# Patient Record
Sex: FEMALE | NOT HISPANIC OR LATINO | Employment: FULL TIME | URBAN - METROPOLITAN AREA
[De-identification: names, ages, dates, MRNs, and addresses within clinical notes are randomized per-mention and may not be internally consistent; named-entity substitution may affect disease eponyms.]

---

## 2019-07-20 ENCOUNTER — HOSPITAL ENCOUNTER (EMERGENCY)
Facility: HOSPITAL | Age: 24
Discharge: HOME | End: 2019-07-20
Attending: EMERGENCY MEDICINE | Admitting: EMERGENCY MEDICINE

## 2019-07-20 VITALS
HEIGHT: 66 IN | RESPIRATION RATE: 18 BRPM | DIASTOLIC BLOOD PRESSURE: 61 MMHG | SYSTOLIC BLOOD PRESSURE: 107 MMHG | WEIGHT: 155 LBS | TEMPERATURE: 98.6 F | BODY MASS INDEX: 24.91 KG/M2 | HEART RATE: 62 BPM | OXYGEN SATURATION: 99 %

## 2019-07-20 DIAGNOSIS — E86.0 DEHYDRATION: Primary | ICD-10-CM

## 2019-07-20 LAB
ANION GAP SERPL CALCULATED.3IONS-SCNC: 11 MMOL/L (ref 4–13)
ANISOCYTOSIS BLD QL SMEAR: PRESENT
BUN SERPL-MCNC: 20 MG/DL (ref 7–25)
CALCIUM SERPL-MCNC: 9.9 MG/DL (ref 8.6–10.5)
CHLORIDE SERPL-SCNC: 104 MMOL/L (ref 98–107)
CO2 SERPL-SCNC: 26 MMOL/L (ref 21–31)
CREAT SERPL-MCNC: 0.9 MG/DL (ref 0.6–1.2)
ERYTHROCYTE [DISTWIDTH] IN BLOOD BY AUTOMATED COUNT: 15.4 % (ref 11.5–14.5)
GFR SERPL CREATININE-BSD FRML MDRD: 91 ML/MIN/1.73SQ M
GLUCOSE SERPL-MCNC: 103 MG/DL (ref 65–99)
HCT VFR BLD AUTO: 42.3 % (ref 42–47)
HGB BLD-MCNC: 13.5 G/DL (ref 12–16)
LYMPHOCYTES # BLD AUTO: 0.83 THOUSAND/UL (ref 0.6–4.47)
LYMPHOCYTES # BLD AUTO: 4 % (ref 20–51)
MCH RBC QN AUTO: 20.8 PG (ref 26–34)
MCHC RBC AUTO-ENTMCNC: 31.9 G/DL (ref 31–37)
MCV RBC AUTO: 65 FL (ref 81–99)
MICROCYTES BLD QL AUTO: PRESENT
MONOCYTES # BLD AUTO: 0.21 THOUSAND/UL (ref 0–1.22)
MONOCYTES NFR BLD AUTO: 1 % (ref 4–12)
NEUTS BAND NFR BLD MANUAL: 17 % (ref 0–8)
NEUTS SEG # BLD: 19.76 THOUSAND/UL (ref 1.81–6.82)
NEUTS SEG NFR BLD AUTO: 78 % (ref 42–75)
OVALOCYTES BLD QL SMEAR: PRESENT
PLATELET # BLD AUTO: 311 THOUSANDS/UL (ref 149–390)
PLATELET BLD QL SMEAR: ADEQUATE
PMV BLD AUTO: 8.7 FL (ref 8.6–11.7)
POTASSIUM SERPL-SCNC: 4 MMOL/L (ref 3.5–5.5)
RBC # BLD AUTO: 6.5 MILLION/UL (ref 3.9–5.2)
RBC MORPH BLD: PRESENT
SODIUM SERPL-SCNC: 141 MMOL/L (ref 134–143)
TOTAL CELLS COUNTED SPEC: 100
WBC # BLD AUTO: 20.8 THOUSAND/UL (ref 4.8–10.8)

## 2019-07-20 PROCEDURE — 85007 BL SMEAR W/DIFF WBC COUNT: CPT | Performed by: EMERGENCY MEDICINE

## 2019-07-20 PROCEDURE — 80048 BASIC METABOLIC PNL TOTAL CA: CPT | Performed by: EMERGENCY MEDICINE

## 2019-07-20 PROCEDURE — 99283 EMERGENCY DEPT VISIT LOW MDM: CPT

## 2019-07-20 PROCEDURE — 96361 HYDRATE IV INFUSION ADD-ON: CPT

## 2019-07-20 PROCEDURE — 36415 COLL VENOUS BLD VENIPUNCTURE: CPT | Performed by: EMERGENCY MEDICINE

## 2019-07-20 PROCEDURE — 85027 COMPLETE CBC AUTOMATED: CPT | Performed by: EMERGENCY MEDICINE

## 2019-07-20 PROCEDURE — 96374 THER/PROPH/DIAG INJ IV PUSH: CPT

## 2019-07-20 RX ORDER — ONDANSETRON 2 MG/ML
4 INJECTION INTRAMUSCULAR; INTRAVENOUS ONCE
Status: COMPLETED | OUTPATIENT
Start: 2019-07-20 | End: 2019-07-20

## 2019-07-20 RX ORDER — SODIUM CHLORIDE 9 MG/ML
1000 INJECTION, SOLUTION INTRAVENOUS CONTINUOUS
Status: DISCONTINUED | OUTPATIENT
Start: 2019-07-20 | End: 2019-07-20 | Stop reason: HOSPADM

## 2019-07-20 RX ADMIN — SODIUM CHLORIDE 1000 ML/HR: 0.9 INJECTION, SOLUTION INTRAVENOUS at 19:57

## 2019-07-20 RX ADMIN — ONDANSETRON 4 MG: 2 INJECTION INTRAMUSCULAR; INTRAVENOUS at 21:13

## 2019-07-21 NOTE — ED PROVIDER NOTES
History  Chief Complaint   Patient presents with    Vomiting     Pt states she was rafting all day and only drank one bottle of water  Pt c/o N/V and upper ABD pain  26YEAR-OLD FEMALE WITH NO HISTORY OF DISEASE PRESENTS TO THE EMERGENCY DEPARTMENT WITH 2 EPISODES OF VOMITING AND AN EPISODE OF DIARRHEA AFTER RAFTING ALL DAY AND HAVING CONSUMED JUST THE SINGLE BOTTLE OF WATER IN AMBIENT TEMPERATURE OF CLOSE   PATIENT HAS HAD NO CHANGE IN HER BOWEL OR BLADDER HABITS  THERE IS SOME ASSOCIATED THE REFLUX AND NAUSEA  THE PATIENT DENIES COUGH, SORE THROAT, OR RHINORRHEA  THERE IS NO VAGINAL BLEEDING OR DISCHARGE WITH THE LAST MENSTRUAL  APPROXIMATELY A MONTH AGO THAT WAS NORMAL FOR THE PATIENT  None       Past Medical History:   Diagnosis Date    Thalassemia        History reviewed  No pertinent surgical history  History reviewed  No pertinent family history  I have reviewed and agree with the history as documented  Social History     Tobacco Use    Smoking status: Never Smoker    Smokeless tobacco: Never Used   Substance Use Topics    Alcohol use: Never     Frequency: Never    Drug use: Never        Review of Systems   Constitutional: Negative for chills and fever  HENT: Negative for ear pain, rhinorrhea and sore throat  Eyes: Negative for pain, redness and visual disturbance  Respiratory: Negative for cough and shortness of breath  Cardiovascular: Negative for chest pain and leg swelling  Gastrointestinal: Negative for abdominal pain, diarrhea, nausea and vomiting  Genitourinary: Negative for dysuria, flank pain, frequency and urgency  Musculoskeletal: Negative for back pain, myalgias and neck pain  Skin: Negative for rash  Neurological: Negative for dizziness, weakness, light-headedness and headaches  Hematological: Negative  Psychiatric/Behavioral: Negative for agitation, confusion and suicidal ideas  The patient is not nervous/anxious      All other systems reviewed and are negative  Physical Exam  Physical Exam   Constitutional: She is oriented to person, place, and time  She appears well-developed and well-nourished  HENT:   Nose: Nose normal    Mouth/Throat: No oropharyngeal exudate  THERE IS SOME DRYING OF THE ORAL MUCOSA  Eyes: Pupils are equal, round, and reactive to light  Conjunctivae and EOM are normal  No scleral icterus  Neck: Normal range of motion  Neck supple  No JVD present  No tracheal deviation present  Cardiovascular: Normal rate, regular rhythm and normal heart sounds  No murmur heard  Pulmonary/Chest: Effort normal and breath sounds normal  No respiratory distress  She has no wheezes  She has no rales  Abdominal: Soft  Bowel sounds are normal  There is no tenderness  There is no guarding  Musculoskeletal: Normal range of motion  She exhibits no edema or tenderness  Neurological: She is alert and oriented to person, place, and time  No cranial nerve deficit or sensory deficit  She exhibits normal muscle tone  5/5 motor, nl sens   Skin: Skin is warm and dry  Psychiatric: She has a normal mood and affect  Her behavior is normal    Nursing note and vitals reviewed        Vital Signs  ED Triage Vitals [07/20/19 1957]   Temperature Pulse Respirations Blood Pressure SpO2   98 6 °F (37 °C) 66 20 107/61 100 %      Temp Source Heart Rate Source Patient Position - Orthostatic VS BP Location FiO2 (%)   Temporal Monitor -- Right arm --      Pain Score       8           Vitals:    07/20/19 1957   BP: 107/61   Pulse: 66         Visual Acuity      ED Medications  Medications - No data to display    Diagnostic Studies  Results Reviewed     None                 No orders to display              Procedures  Procedures       ED Course                               MDM  Number of Diagnoses or Management Options  Diagnosis management comments: PATIENT'S MOTHER HAS DECLINED ANY HOSPITALIZATION OR INSTITUTIONALIZATION FOR THE PATIENT AND AS OUTPATIENT FOLLOW-UP IN PLACE AT THE BEGINNING OF THE WEEK  PATIENT'S ACCORDINGLY DISCHARGE HOME  Disposition  Final diagnoses:   None     ED Disposition     None      Follow-up Information    None         Patient's Medications    No medications on file     No discharge procedures on file      ED Provider  Electronically Signed by           Nishi Alvarado MD  07/20/19 2014       Nishi Alvarado MD  07/20/19 2030